# Patient Record
Sex: FEMALE
[De-identification: names, ages, dates, MRNs, and addresses within clinical notes are randomized per-mention and may not be internally consistent; named-entity substitution may affect disease eponyms.]

---

## 2018-08-04 ENCOUNTER — HOSPITAL ENCOUNTER (INPATIENT)
Dept: HOSPITAL 5 - ED | Age: 48
LOS: 2 days | Discharge: HOME | DRG: 103 | End: 2018-08-06
Attending: INTERNAL MEDICINE | Admitting: INTERNAL MEDICINE
Payer: MEDICAID

## 2018-08-04 DIAGNOSIS — Z82.49: ICD-10-CM

## 2018-08-04 DIAGNOSIS — G43.909: Primary | ICD-10-CM

## 2018-08-04 DIAGNOSIS — Z88.2: ICD-10-CM

## 2018-08-04 DIAGNOSIS — Z90.89: ICD-10-CM

## 2018-08-04 DIAGNOSIS — Z90.49: ICD-10-CM

## 2018-08-04 DIAGNOSIS — R07.9: ICD-10-CM

## 2018-08-04 DIAGNOSIS — F17.200: ICD-10-CM

## 2018-08-04 DIAGNOSIS — F31.9: ICD-10-CM

## 2018-08-04 DIAGNOSIS — Z88.1: ICD-10-CM

## 2018-08-04 DIAGNOSIS — Z98.51: ICD-10-CM

## 2018-08-04 DIAGNOSIS — G45.9: ICD-10-CM

## 2018-08-04 PROCEDURE — 99291 CRITICAL CARE FIRST HOUR: CPT

## 2018-08-04 PROCEDURE — 93010 ELECTROCARDIOGRAM REPORT: CPT

## 2018-08-04 PROCEDURE — 85610 PROTHROMBIN TIME: CPT

## 2018-08-04 PROCEDURE — 70551 MRI BRAIN STEM W/O DYE: CPT

## 2018-08-04 PROCEDURE — 80061 LIPID PANEL: CPT

## 2018-08-04 PROCEDURE — 84484 ASSAY OF TROPONIN QUANT: CPT

## 2018-08-04 PROCEDURE — 36415 COLL VENOUS BLD VENIPUNCTURE: CPT

## 2018-08-04 PROCEDURE — 99406 BEHAV CHNG SMOKING 3-10 MIN: CPT

## 2018-08-04 PROCEDURE — 85025 COMPLETE CBC W/AUTO DIFF WBC: CPT

## 2018-08-04 PROCEDURE — 84703 CHORIONIC GONADOTROPIN ASSAY: CPT

## 2018-08-04 PROCEDURE — 80048 BASIC METABOLIC PNL TOTAL CA: CPT

## 2018-08-04 PROCEDURE — 82962 GLUCOSE BLOOD TEST: CPT

## 2018-08-04 PROCEDURE — 93880 EXTRACRANIAL BILAT STUDY: CPT

## 2018-08-04 PROCEDURE — 93005 ELECTROCARDIOGRAM TRACING: CPT

## 2018-08-04 PROCEDURE — 70450 CT HEAD/BRAIN W/O DYE: CPT

## 2018-08-04 PROCEDURE — 85730 THROMBOPLASTIN TIME PARTIAL: CPT

## 2018-08-04 PROCEDURE — 93306 TTE W/DOPPLER COMPLETE: CPT

## 2018-08-05 LAB
APTT BLD: 28.9 SEC. (ref 24.2–36.6)
BASOPHILS # (AUTO): 0 K/MM3 (ref 0–0.1)
BASOPHILS NFR BLD AUTO: 0.6 % (ref 0–1.8)
BUN SERPL-MCNC: 9 MG/DL (ref 7–17)
BUN/CREAT SERPL: 9 %
CALCIUM SERPL-MCNC: 8.8 MG/DL (ref 8.4–10.2)
EOSINOPHIL # BLD AUTO: 0.3 K/MM3 (ref 0–0.4)
EOSINOPHIL NFR BLD AUTO: 3.6 % (ref 0–4.3)
HCT VFR BLD CALC: 33.6 % (ref 30.3–42.9)
HDLC SERPL-MCNC: 58 MG/DL (ref 40–59)
HEMOLYSIS INDEX: 2
HGB BLD-MCNC: 12.1 GM/DL (ref 10.1–14.3)
INR PPP: 0.89 (ref 0.87–1.13)
LYMPHOCYTES # BLD AUTO: 3.7 K/MM3 (ref 1.2–5.4)
LYMPHOCYTES NFR BLD AUTO: 46.6 % (ref 13.4–35)
MCH RBC QN AUTO: 30 PG (ref 28–32)
MCHC RBC AUTO-ENTMCNC: 36 % (ref 30–34)
MCV RBC AUTO: 83 FL (ref 79–97)
MONOCYTES # (AUTO): 0.6 K/MM3 (ref 0–0.8)
MONOCYTES % (AUTO): 8.1 % (ref 0–7.3)
PLATELET # BLD: 334 K/MM3 (ref 140–440)
RBC # BLD AUTO: 4.08 M/MM3 (ref 3.65–5.03)

## 2018-08-05 RX ADMIN — ACETAMINOPHEN PRN MG: 325 TABLET ORAL at 17:49

## 2018-08-05 RX ADMIN — ENOXAPARIN SODIUM SCH MG: 100 INJECTION SUBCUTANEOUS at 11:42

## 2018-08-05 RX ADMIN — SODIUM CHLORIDE SCH MLS/HR: 0.9 INJECTION, SOLUTION INTRAVENOUS at 06:21

## 2018-08-05 RX ADMIN — SODIUM CHLORIDE SCH MLS/HR: 0.9 INJECTION, SOLUTION INTRAVENOUS at 17:50

## 2018-08-05 RX ADMIN — ASPIRIN SCH: 325 TABLET ORAL at 10:00

## 2018-08-05 NOTE — EMERGENCY DEPARTMENT REPORT
- General


Chief complaint: Chest Pain


Stated complaint: HEAD/NECK/CHEST PAIN


Time Seen by Provider: 08/05/18 00:35


Source: patient, EMS


Mode of arrival: Ambulatory


Limitations: No Limitations





- History of Present Illness


Initial comments: 


She is a 48-year-old female that presents emergency room with chest pain 1 

hour.  Patient also reports having numbness tingling in both arms and legs.  

Patient states she is also having a bad headache.  Patient states she lost the 

ability to speak for approximately 45-50 minutes.  Patient states her blood 

pressure really high school and on and she took a blood pressure medication and 

her ability to speak and back.  Patient denies nausea and vomiting and 

shortness of breath.  Patient states the chest pain is a pressure in her 

central chest and non-radiating.  Patient states chest pain and headache are 

better with rest and worse with exertion.  Patient states that she never lost 

ability to move her arms and legs.





MD Complaint: generalized weakness


-: Sudden





- Related Data


 Allergies











Allergy/AdvReac Type Severity Reaction Status Date / Time


 


Sulfa (Sulfonamide Allergy  Shortness Verified 03/31/18 11:36





Antibiotics)   of Breath  


 


sulfamethoxazole Allergy  Unknown Verified 08/04/18 23:42





[From Bactrim]     


 


trimethoprim [From Bactrim] Allergy  Unknown Verified 08/04/18 23:42














ED Review of Systems


ROS: 


Stated complaint: HEAD/NECK/CHEST PAIN


Other details as noted in HPI





Constitutional: denies: chills, fever


Eyes: denies: eye pain, eye discharge, vision change


ENT: denies: ear pain, throat pain


Respiratory: denies: cough, shortness of breath, wheezing


Cardiovascular: chest pain.  denies: palpitations


Endocrine: no symptoms reported


Gastrointestinal: denies: abdominal pain, nausea, diarrhea


Genitourinary: denies: urgency, dysuria, discharge


Musculoskeletal: denies: back pain, joint swelling, arthralgia


Skin: denies: rash, lesions


Neurological: headache.  denies: weakness, paresthesias


Psychiatric: denies: anxiety, depression


Hematological/Lymphatic: denies: easy bleeding, easy bruising





ED Past Medical Hx





- Past Medical History


Previous Medical History?: Yes


Hx Headaches / Migraines: Yes


Hx Psychiatric Treatment: Yes (Bipolar)


Additional medical history: MRSA, Speech impairment, stomach ulcers





- Surgical History


Past Surgical History?: Yes


Additional Surgical History: Tonsil removed, Tubaligation, right Carpel tunnel; 

surgery





- Family History


Family history: hypertension





- Social History


Smoking Status: Current Every Day Smoker


Substance Use Type: None





ED Physical Exam





- General


Limitations: No Limitations


General appearance: alert, in no apparent distress





- Head


Head exam: Present: atraumatic, normocephalic





- Eye


Eye exam: Present: normal appearance





- ENT


ENT exam: Present: mucous membranes moist





- Neck


Neck exam: Present: normal inspection





- Respiratory


Respiratory exam: Present: normal lung sounds bilaterally.  Absent: respiratory 

distress





- Cardiovascular


Cardiovascular Exam: Present: regular rate, normal rhythm.  Absent: systolic 

murmur, diastolic murmur, rubs, gallop





- GI/Abdominal


GI/Abdominal exam: Present: soft, normal bowel sounds





- Extremities Exam


Extremities exam: Present: normal inspection





- Back Exam


Back exam: Present: normal inspection





- Neurological Exam


Neurological exam: Present: alert, oriented X3





- Psychiatric


Psychiatric exam: Present: normal affect, normal mood





- Skin


Skin exam: Present: warm, dry, intact, normal color.  Absent: rash





- Assessment


Assessment Interval: Baseline





- Level of Consciousness


1a. Level of Consciousness: alert





- LOC Questions


1b. LOC Questions: answers correctly





- LOC Command


1c. LOC Commands: performs tasks correctly





- Best Gaze


2. Best Gaze: normal





- Visual


3. Visual: no visual loss





- Facial Palsy


4. Facial Palsy: normal symmetrical movement





- Motor Arm


5b. Motor Arm Right: no drift


5a. Motor Arm Left: no drift





- Motor Leg


6a. Motor Leg Left: no drift


6b. Motor Leg Right: no drift





- Limb Ataxia


7. Limb Ataxia: absent





- Sensory


8. Sensory: normal





- Best Language


9. Best Language: no aphasia





- Dysarthria


10. Dysarthria: normal





- Extinction and Inattention


11. Extinction/Inattention: no abnormality





- Scoring


Total Score: 0


Stroke Severity: No Stroke Symptoms





ED Course


 Vital Signs











  08/04/18 08/05/18 08/05/18





  23:49 00:48 01:01


 


Temperature 97.9 F  


 


Pulse Rate 70 59 L 57 L


 


Respiratory 20 14 12





Rate   


 


Blood Pressure 139/70  


 


O2 Sat by Pulse 100  98





Oximetry   














  08/05/18 08/05/18 08/05/18





  01:15 01:31 01:45


 


Temperature   


 


Pulse Rate 58 L 58 L 56 L


 


Respiratory 10 L 13 15





Rate   


 


Blood Pressure 128/93 110/47 112/46


 


O2 Sat by Pulse 97 97 96





Oximetry   














  08/05/18 08/05/18 08/05/18





  02:01 02:15 02:31


 


Temperature   


 


Pulse Rate 51 L 68 51 L


 


Respiratory 12 13 15





Rate   


 


Blood Pressure 107/44 95/55 102/79


 


O2 Sat by Pulse 99 98 99





Oximetry   














  08/05/18 08/05/18 08/05/18





  03:55 04:01 04:11


 


Temperature   


 


Pulse Rate 60 60 58 L


 


Respiratory 12 11 L 14





Rate   


 


Blood Pressure 116/74 110/66 116/74


 


O2 Sat by Pulse 100 99 99





Oximetry   














  08/05/18 08/05/18





  04:21 04:31


 


Temperature  


 


Pulse Rate 59 L 60


 


Respiratory 14 13





Rate  


 


Blood Pressure 113/63 113/63


 


O2 Sat by Pulse 98 98





Oximetry  














- Reevaluation(s)


Reevaluation #1: 


Exam documented.  Patient sent to CT for stat CT of head.


08/05/18 00:35





Reevaluation #2: 


CT head negative.  We'll consult tele neuro. per clinical exam,  the patient is 

not a candidate for TPA


08/05/18 00:43











Reevaluation #3: 


Discussed case with neurologist. Dr. Vanegas agrees with plan of care that the 

patient is not a candidate for TPA, however recommends admission to the 

hospital and MRI


08/05/18 01:55

















- Consultations


Consultation #1: 


Hospitalist consulted for admission.  Dr. Griffin to admit patient.  Dr. Griffin to 

assume care.


08/05/18 02:34








ED Medical Decision Making





- Lab Data


Result diagrams: 


 08/05/18 00:28





 08/05/18 00:28





- EKG Data


-: EKG Interpreted by Me


EKG shows normal: sinus rhythm, axis, intervals, QRS complexes, ST-T waves


Rate: bradycardia





- Radiology Data


Radiology results: report reviewed


nad on head ct. 








- Medical Decision Making


Patient is a 48-year-old female that presents emergency room with complaints of 

chest pain and inability to speak.  All symptoms have resolved on patient.  

Neurologist consulted and recommends admission to the hospital and at MRI.  

Patient to be admitted to the hospitalist service for further evaluation and 

treatment.








- Differential Diagnosis


cp. acs. tia. htn. ha. migraine. 


Critical Care Time: Yes


Critical care attestation.: 


If time is entered above; I have spent that time in minutes in the direct care 

of this critically ill patient, excluding procedure time.





Critical Care Time: 


45 minutes for cc time. 








ED Disposition


Clinical Impression: 


 Weakness, TIA (transient ischemic attack)





Headache


Qualifiers:


 Headache type: unspecified Headache chronicity pattern: acute headache 

Intractability: not intractable Qualified Code(s): R51 - Headache





Chest pain


Qualifiers:


 Chest pain type: unspecified Qualified Code(s): R07.9 - Chest pain, unspecified





Disposition: DC-09 OP ADMIT IP TO THIS HOSP


Is pt being admited?: Yes


Does the pt Need Aspirin: No


Condition: Critical


Time of Disposition: 02:35

## 2018-08-05 NOTE — CONSULTATION
History of Present Illness


Consult date: 08/05/18


History of present illness: 


notes reveal she had slurring of speech and left arm numbness  the MRI is 

normal neuro exam is normal except c/o headaches  suspect she has speech 

impediment  chronic  await Carotid u/s and ECHO  TIA w[u is pending





Thanks will follow up





explained w/u to the patient








Medications and Allergies


 Allergies











Allergy/AdvReac Type Severity Reaction Status Date / Time


 


Sulfa (Sulfonamide Allergy  Shortness Verified 03/31/18 11:36





Antibiotics)   of Breath  


 


sulfamethoxazole Allergy  Unknown Verified 08/04/18 23:42





[From Bactrim]     


 


trimethoprim [From Bactrim] Allergy  Unknown Verified 08/04/18 23:42











Active Meds: 


Active Medications





Acetaminophen (Tylenol)  650 mg PO Q4H PRN


   PRN Reason: Pain, Mild (1-3)


Aspirin (Aspirin)  325 mg PO QDAY [facility]


   Last Admin: 08/05/18 10:00 Dose:  Not Given


Atorvastatin Calcium (Lipitor)  40 mg PO QHS JULIO CESAR


Bisacodyl (Dulcolax)  10 mg NH QDAY PRN


   PRN Reason: Constipation


Enoxaparin Sodium (Lovenox)  40 mg SUB-Q QDAY@1000 [facility]


   Last Admin: 08/05/18 11:42 Dose:  40 mg


Hydralazine HCl (Apresoline)  5 mg IV Q6H PRN


   PRN Reason: Keep SBP between 160-185 mm Hg


Sodium Chloride (Nacl 0.9% 1000 Ml)  1,000 mls @ 150 mls/hr IV AS DIRECT [facility]


   Last Admin: 08/05/18 06:21 Dose:  150 mls/hr


Magnesium Hydroxide (Milk Of Magnesia)  30 ml PO Q4H PRN


   PRN Reason: Constipation


Ondansetron HCl (Zofran)  4 mg IV Q8H PRN


   PRN Reason: Nausea And Vomiting


Sodium Chloride (Sodium Chloride Flush Syringe 10 Ml)  10 ml IV PRN PRN


   PRN Reason: LINE FLUSH











Physical Examination





- Vital Signs


Vital Signs: 


 Vital Signs











Temp Pulse Resp BP Pulse Ox


 


 97.9 F   70   20   139/70   100 


 


 08/04/18 23:49  08/04/18 23:49  08/04/18 23:49  08/04/18 23:49  08/04/18 23:49














- Assessment


Assessment Interval: Baseline





- Level of Consciousness


1a. Level of Consciousness: alert





- LOC Questions


1b. LOC Questions: answers correctly





- LOC Command


1c. LOC Commands: performs tasks correctly





- Best Gaze


2. Best Gaze: normal





- Visual


3. Visual: no visual loss





- Facial Palsy


4. Facial Palsy: normal symmetrical movement





- Motor Arm


5b. Motor Arm Right: no drift





- Motor Leg


6a. Motor Leg Left: no drift





- Limb Ataxia


7. Limb Ataxia: absent





- Sensory


8. Sensory: normal





- Best Language


9. Best Language: no aphasia





- Dysarthria


10. Dysarthria: normal





- Extinction and Inattention


11. Extinction/Inattention: no abnormality





Results





- Laboratory Findings


CBC and BMP: 


 08/05/18 00:28





 08/05/18 00:28


Abnormal Lab Findings: 


 Abnormal Labs











  08/05/18 08/05/18 08/05/18





  00:28 00:28 12:01


 


MCHC  36 H  


 


Lymph % (Auto)  46.6 H  


 


Mono % (Auto)  8.1 H  


 


Glucose   112 H 


 


Cholesterol    224 H


 


LDL Cholesterol Direct    172 H

## 2018-08-05 NOTE — HISTORY AND PHYSICAL REPORT
History of Present Illness


Date of examination: 08/05/18


History of present illness: 


48-year-old woman with history of hypertension comes emergency room because she 

developed numbness on the arms, left leg and aphasia.  When she arrived in the 

emergency room she started speaking but her speech is slurred


Review of systems


Constitutional: no  weight loss, chills, fever


Ears, eyes, nose, mouth and throat: no nasal congestion, no nasal discharge, no 

sinus pressure, no vision change, no red eye.


Neck: No neck pain or rigidity.


Cardiovascular: no chest pain, palpitations


Respiratory: no cough, shortness of breath


Gastrointestinal: no abdominal pain hematochezia


Genitourinary : no  frequency , no hematuria


Musculoskeletal: no joint swelling or muscle ache 


Integumentary: no rash, no pruritis


Neurological: no  focal weakness


Endocrine: no cold or heat intolerance, no polyuria or polydipsia


Hematologic/Lymphatic: no easy bruising, no easy bleeding, no gland swelling


Allergic/Immunologic: no urticaria, no angioedema.





PAST MEDICAL HISTORY: Hypertension





PAST SURGICAL HISTORY: Tubal ligation, carpal tunnel release, tonsillectomy





SOCIAL HISTORY: No alcohol, no drugs, smokes





FAMILY HISTORY: Hypertension








Medications and Allergies


 Allergies











Allergy/AdvReac Type Severity Reaction Status Date / Time


 


Sulfa (Sulfonamide Allergy  Shortness Verified 03/31/18 11:36





Antibiotics)   of Breath  


 


sulfamethoxazole Allergy  Unknown Verified 08/04/18 23:42





[From Bactrim]     


 


trimethoprim [From Bactrim] Allergy  Unknown Verified 08/04/18 23:42














Exam





- Physical Exam


Narrative exam: 


Gen. appearance: Patient lying in bed, no apparent distress


HEENT: Normocephalic, atraumatic, pupils equally round and reactive to light,  

extraocular movement intact, and no sclericterus,. No JVD or thyromegaly or 

nodule,neck supple, no carotid bruit ,mucous membranes moist, no exudate or 

erythema


Heart: S1, S2, regular rate and rhythm


Lungs: Clear bilaterally, breathing comfortable


Abdomen: Positive bowel sounds, non-tender, nondistended, no organomegaly


Extremity:no edema cyanosis, clubbing


Skin:  no rash, dry, warm


Neuro: Oriented 3, cranial nerves II-12 intact, speech is slurred, motor and 

sensory intact














- Constitutional


Vitals: 


 











Temp Pulse Resp BP Pulse Ox


 


 97.9 F   51 L  15   102/79   99 


 


 08/04/18 23:49  08/05/18 02:31  08/05/18 02:31  08/05/18 02:31  08/05/18 02:31














Results





- Labs


CBC & Chem 7: 


 08/05/18 00:28





 08/05/18 00:28


Labs: 


 Abnormal lab results











  08/05/18 08/05/18 Range/Units





  00:28 00:28 


 


MCHC  36 H   (30-34)  %


 


Lymph % (Auto)  46.6 H   (13.4-35.0)  %


 


Mono % (Auto)  8.1 H   (0.0-7.3)  %


 


Glucose   112 H  ()  mg/dL














- Imaging and Cardiology


CT Scan - head: report reviewed





Assessment and Plan


Assessment


Acute CVA


Hypertension





Plan


Admit medicine


Obtain MRI of the head, carotid Doppler, echo


Do neuro checks, swallow screen


Start aspirin, statin


Albuterol as needed for blood pressure check


Consult neurology, physical and occupational therapy


DVT prophylaxis

## 2018-08-05 NOTE — CAT SCAN REPORT
FINAL REPORT



PROCEDURE:  CT HEAD/BRAIN WO CON



TECHNIQUE:  Computerized tomography of the head was performed

without contrast material. 



HISTORY:  neuro deficits &lt; 6hrs or sx present upon awakening





COMPARISON:  No prior studies are available for comparison.



FINDINGS:  

Skull and scalp: Normal.



Paranasal sinuses: Normal.



Ventricles and subarachnoid spaces: Normal.



Cerebrum: No evidence of hemorrhage, acute infarction or mass .



Cerebellum and brainstem: No evidence of hemorrhage, acute

infarction or mass.



Vasculature: Normal.



Comments: None.



IMPRESSION:  

There is no evidence of an acute intracranial process
normal...

## 2018-08-05 NOTE — MAGNETIC RESONANCE REPORT
FINAL REPORT



EXAM:  MR BRAIN WO CON



HISTORY:  stroke 



COMPARISON:  CT of the head performed on 08/04/2018



TECHNIQUE:  Multiplanar and multisequential imaging of the brain

was performed without administration of IV contrast



FINDINGS:  

There is normal brain volume for the patient's age. There is

normal gray-white differentiation. There is no parenchymal

hemorrhage or extra-axial fluid collection. There is no mass or

mass effect. There is no restricted diffusion to suggest acute

infarct. The ventricles are midline and are not enlarged. The

subarachnoid spaces and basilar cisterns are clear. There is

normal signal of the bony calvarium and the soft tissues of the

scalp. The bilateral orbits are intact.



IMPRESSION:  

No acute intracranial abnormality.

## 2018-08-05 NOTE — EVENT NOTE
Date: 08/05/18


Patient with slurred speech, left arm numbness. MRI Brain negative. Neurology 

to see.

## 2018-08-06 VITALS — DIASTOLIC BLOOD PRESSURE: 78 MMHG | SYSTOLIC BLOOD PRESSURE: 130 MMHG

## 2018-08-06 RX ADMIN — ENOXAPARIN SODIUM SCH MG: 100 INJECTION SUBCUTANEOUS at 10:55

## 2018-08-06 RX ADMIN — SODIUM CHLORIDE SCH MLS/HR: 0.9 INJECTION, SOLUTION INTRAVENOUS at 06:31

## 2018-08-06 RX ADMIN — ASPIRIN SCH MG: 325 TABLET ORAL at 10:55

## 2018-08-06 RX ADMIN — ACETAMINOPHEN PRN MG: 325 TABLET ORAL at 05:00

## 2018-08-06 NOTE — DISCHARGE SUMMARY
Providers





- Providers


Date of Admission: 


08/05/18 03:12





Date of discharge: 08/06/18


Attending physician: 


KELSY QIUÑONEZ





 





08/05/18


Consult to Physician [CONS] Routine 


   Comment: 


   Consulting Provider: SUNNY GIBBS


   Physician Instructions: 


   Reason For Exam: tia/cva





08/05/18 05:31


Occupational Therapy Evaluate and Treat [CONS] Routine 


   Comment: 


   Reason For Exam: Neuro deficits


Physical Therapy Evaluation and Treat [CONS] Routine 


   Comment: 


   Reason For Exam: Neuro deficits





08/06/18 08:03


Speech Therapy Evaluation and Treat [CONS] Routine 


   Reason For Exam: dysartria











Primary care physician: 


PRIMARY CARE MD








Hospitalization


Condition: Fair


Disposition: DC-01 TO HOME OR SELFCARE





Core Measure Documentation





- Palliative Care


Palliative Care/ Comfort Measures: Not Applicable





- Core Measures


Any of the following diagnoses?: none





Exam





- Constitutional


Vitals: 


 











Temp Pulse Resp BP Pulse Ox


 


 97.9 F   55 L  20   130/78   97 


 


 08/06/18 11:40  08/06/18 11:40  08/06/18 11:40  08/06/18 11:40  08/06/18 11:40














Plan


Activity: advance as tolerated


Diet: low fat, low cholesterol, low salt


Additional Instructions: 1.Follow up with PCP in 1 week.  2.Follow up with 

Neurology, Dr. Gibbs in 1 week.  3.Outpatient speech therapy


Follow up with: 


PRIMARY CARE,MD [Primary Care Provider] - 7 Days


Prescriptions: 


AtorvaSTATin [Lipitor] 40 mg PO QHS #30 tablet


Butalb/Acetamin/Caff -40 [Fioricet] 1 tab PO Q4H PRN #20 tablet


 PRN Reason: Headache


Famotidine [Pepcid] 20 mg PO BID #30 tablet

## 2018-08-06 NOTE — CONSULTATION
ROOM NUMBER:  [id number]



HISTORY OF PRESENT ILLNESS:  This is a 48-year-old white female that presents to

[facility] on 08/03/2018.  This patient initially

presented to the hospital Emergency Room with complaints of numbness and

weakness in both arms and legs.  She stated that she was having severe headache

and loss of ability to speak for approximately 45-50 minutes.  Her blood

pressure was initially elevated although in presentation in the Emergency Room,

review of the blood pressures on 08/04/2018 and 08/05/2018 indicated they are

all within the normal range.  She had a CT scan of the head, which was negative.

 The patient was admitted to the hospital subsequently with continued complaints

of chest pain and inability to speak.  By the time I have seen the patient,

review of the electrolytes were unremarkable.  Glucose was 112, hematocrit 33.6.

 Her pO2 was 98%.  She was alert, appropriate.  Speech was clear, although she

did have to me a speech impediment with slurring of speech, which is typical for

inarticulate speech as opposed to aphasia.  She has symmetrical head and neck

movements, symmetrical facial movements.  No drift to extremities.  She does

complain of a slight headache at present time.  I do not find any focal motor

weakness in the arms or legs.  I did review her MRI scan of the brain, it is

unremarkable.  I have also reviewed her echocardiogram, which shows a normal

ejection fraction of 55-60%, ventricular systolic function is good.  I would

comment that there appears to be very mild tricuspid valve regurgitation.  The

other changes in the ___ echocardiogram is normal.



IMPRESSION:  I am inclined to think this patient's problems are related to

migraine.  She had a headache.  She has this patchy numbness, which comes and

goes in her arms and legs, although objectively there is nothing on examination,

which points this being a hard finding.  I do not find any focal motor weakness.

 Her dysarthric speech I believe is preexisting speech inarticulation as opposed

to aphasia, but more detailed history from family might be useful if there has

been some change.  I do not see anything on the MRI scan and carotid artery

ultrasound has not been yet interpreted.  We will get an EEG on the patient,

also sed rate.





DD: 08/06/2018 08:22

DT: 08/06/2018 11:09

JOB# [id number]  [id number]

STANISLAV/NTS

## 2018-08-06 NOTE — PROGRESS NOTE
Subjective


Date of service: 08/06/18


Interval history: 


see my dictated note the patient I suspect has complicated migraine doubt 

stroke based on ECHO and MRI results  BP's have been normal








Objective





- Vital Sign


 Vital Signs - 12hr











  08/05/18 08/05/18 08/06/18





  22:44 23:29 04:12


 


Temperature  98.1 F 98.2 F


 


Pulse Rate  57 L 67


 


Respiratory  18 18





Rate   


 


Blood Pressure  118/52 125/78


 


O2 Sat by Pulse 98 97 99





Oximetry   














- Laboratory Findings


CBC and BMP: 


 08/05/18 00:28





 08/05/18 00:28


Abnormal Lab Findings: 


 Abnormal Labs











  08/05/18 08/05/18 08/05/18





  00:28 00:28 12:01


 


MCHC  36 H  


 


Lymph % (Auto)  46.6 H  


 


Mono % (Auto)  8.1 H  


 


Glucose   112 H 


 


POC Glucose   


 


Cholesterol    224 H


 


LDL Cholesterol Direct    172 H














  08/05/18 08/05/18





  15:59 21:54


 


MCHC  


 


Lymph % (Auto)  


 


Mono % (Auto)  


 


Glucose  


 


POC Glucose  107 H  110 H


 


Cholesterol  


 


LDL Cholesterol Direct

## 2018-08-07 NOTE — VASCULAR LAB REPORT
CAROTID DUPLEX STUDY:



RIGHT        PSVEDV

CCA PROX:[id number]

CCA DIST:[id number]

ICA PROX:[id number]

ICA MID:[id number]

ICA DIST:[id number]

ECA:               94

VERT:                  54       24  



LEFT         PSVEDV

CCA PROX:[id number]

CCA DIST:8330

ICA PROX:[id number]

ICA MID:[id number]

ICA DIST:[id number]

ECA:                    74

VERT:                  49       20





REASON FOR EXAM: Stroke.

     

COMMENTS ON THE RIGHT:

Doppler frequency analysis is consistent with 16 to 49 percent diameter 

reduction of the internal carotid artery.  Minimal amount of plaque is 

seen.  The common carotid artery is patent. The external carotid artery 

is patent.  The vertebral artery has antegrade flow.



COMMENTS ON THE LEFT:

Doppler frequency analysis is consistent with 16 to 49 percent diameter 

reduction of the internal carotid artery.  Minimal amount of plaque is 

seen.  The common carotid artery is patent. The external carotid artery 

is patent.  The vertebral artery has antegrade flow.



IMPRESSION:

Less than 50% diameter reduction in the internal carotid arteries 

bilaterally.

## 2018-08-21 ENCOUNTER — HOSPITAL ENCOUNTER (EMERGENCY)
Dept: HOSPITAL 5 - ED | Age: 48
Discharge: HOME | End: 2018-08-21
Payer: MEDICAID

## 2018-08-21 DIAGNOSIS — Z98.51: ICD-10-CM

## 2018-08-21 DIAGNOSIS — Z88.2: ICD-10-CM

## 2018-08-21 DIAGNOSIS — F17.200: ICD-10-CM

## 2018-08-21 DIAGNOSIS — G43.109: Primary | ICD-10-CM

## 2018-08-21 DIAGNOSIS — I10: ICD-10-CM

## 2018-08-21 DIAGNOSIS — Z90.89: ICD-10-CM

## 2018-08-21 DIAGNOSIS — F31.9: ICD-10-CM

## 2018-08-21 LAB
ALBUMIN SERPL-MCNC: 4.3 G/DL (ref 3.9–5)
ALT SERPL-CCNC: 12 UNITS/L (ref 7–56)
BASOPHILS # (AUTO): 0.1 K/MM3 (ref 0–0.1)
BASOPHILS NFR BLD AUTO: 0.9 % (ref 0–1.8)
BILIRUB DIRECT SERPL-MCNC: < 0.2 MG/DL (ref 0–0.2)
BUN SERPL-MCNC: 10 MG/DL (ref 7–17)
BUN/CREAT SERPL: 11 %
CALCIUM SERPL-MCNC: 9.5 MG/DL (ref 8.4–10.2)
EOSINOPHIL # BLD AUTO: 0.2 K/MM3 (ref 0–0.4)
EOSINOPHIL NFR BLD AUTO: 2.5 % (ref 0–4.3)
HCT VFR BLD CALC: 37.6 % (ref 30.3–42.9)
HEMOLYSIS INDEX: 12
HGB BLD-MCNC: 12.8 GM/DL (ref 10.1–14.3)
LYMPHOCYTES # BLD AUTO: 2.9 K/MM3 (ref 1.2–5.4)
LYMPHOCYTES NFR BLD AUTO: 39 % (ref 13.4–35)
MCH RBC QN AUTO: 29 PG (ref 28–32)
MCHC RBC AUTO-ENTMCNC: 34 % (ref 30–34)
MCV RBC AUTO: 85 FL (ref 79–97)
MONOCYTES # (AUTO): 0.6 K/MM3 (ref 0–0.8)
MONOCYTES % (AUTO): 7.7 % (ref 0–7.3)
PLATELET # BLD: 250 K/MM3 (ref 140–440)
RBC # BLD AUTO: 4.44 M/MM3 (ref 3.65–5.03)

## 2018-08-21 PROCEDURE — 85025 COMPLETE CBC W/AUTO DIFF WBC: CPT

## 2018-08-21 PROCEDURE — 36415 COLL VENOUS BLD VENIPUNCTURE: CPT

## 2018-08-21 PROCEDURE — 99284 EMERGENCY DEPT VISIT MOD MDM: CPT

## 2018-08-21 PROCEDURE — 80074 ACUTE HEPATITIS PANEL: CPT

## 2018-08-21 PROCEDURE — 70450 CT HEAD/BRAIN W/O DYE: CPT

## 2018-08-21 PROCEDURE — 80048 BASIC METABOLIC PNL TOTAL CA: CPT

## 2018-08-21 NOTE — CAT SCAN REPORT
FINAL REPORT



EXAM:  CT HEAD/BRAIN WO CON



HISTORY:  right arm numbess recent CVa 



TECHNIQUE:  2.5 millimeter axial images from the skullbase to the

vertex.



Comparison: MRI brain dated August 5, 2018 and head CT dated

August 5, 2018 



FINDINGS:  

There is no evidence of an acute intracranial process,

intracranial hemorrhage or mass effect.



The ventricles are normal size.



The visualized portions of the orbits, paranasal and mastoid

sinuses are unremarkable.



The bony structures are unremarkable in appearance.



IMPRESSION:  

1. No evidence of an acute intracranial process, intracranial

hemorrhage or mass effect.



If there is a clinical suspicion of acute cerebral ischemia, MRI

brain would be helpful.

## 2018-08-21 NOTE — EMERGENCY DEPARTMENT REPORT
ED Neuro Deficit HPI





- General


Chief Complaint: Neuro Symptoms/Deficit


Stated Complaint: RT ARM NUMBNESS


Time Seen by Provider: 08/21/18 20:06


Source: patient, EMS


Mode of arrival: Ambulatory


Limitations: No Limitations





- History of Present Illness


Initial Comments: 





He is a 48-year-old  female who is presenting with right arm numbness 

that has been off and on for the past 3 days.  Patient states that earlier this 

month she was admitted for possible stroke.  A review of the patient's past 

medical history the patient was admitted on 08/05/2018 and had a headache 

elevated blood pressure chest pain and was unable to speak for approximately 50 

minutes prior to arrival.  Patient states she took her blood pressure medicines 

and then was able to speak again.  Patient was admitted to the hospital had a 

MRI that was normal NIH was 0 and the neurology consult stated the patient most 

likely has compensated migraines as opposed to CVA.  Patient also had carotid 

Dopplers which were within normal limits as well.  Patient today states that 

she has no weakness in arms or legs she just feels a numb sensation.  Patient 

is complaining of right-sided headache as well.  Patient states the headache is 

a 67 out of 10 in severity and pounding.





- Related Data


Home Medications: 


 Previous Rx's











 Medication  Instructions  Recorded  Last Taken  Type


 


AtorvaSTATin [Lipitor] 40 mg PO QHS #30 tablet 08/06/18 Unknown Rx


 


Butalb/Acetamin/Caff -40 1 tab PO Q4H PRN #20 tablet 08/06/18 Unknown Rx





[Fioricet]    


 


Famotidine [Pepcid] 20 mg PO BID #30 tablet 08/06/18 Unknown Rx


 


Nicotine [Habitrol] 21 mg TD DAILY 30 Days  patch 08/06/18 Unknown Rx


 


Butalb/Acetamin/Caff -40 1 tab PO Q6HR PRN #10 tab 08/21/18 Unknown Rx





[Fioricet]    











Allergies/Adverse Reactions: 


 Allergies











Allergy/AdvReac Type Severity Reaction Status Date / Time


 


Sulfa (Sulfonamide Allergy  Shortness Verified 03/31/18 11:36





Antibiotics)   of Breath  


 


sulfamethoxazole Allergy  Unknown Verified 08/04/18 23:42





[From Bactrim]     


 


trimethoprim [From Bactrim] Allergy  Unknown Verified 08/04/18 23:42














ED Review of Systems


ROS: 


Stated complaint: RT ARM NUMBNESS


Other details as noted in HPI





Comment: All other systems reviewed and negative





ED Past Medical Hx





- Past Medical History


Previous Medical History?: Yes


Hx Hypertension: Yes


Hx Headaches / Migraines: Yes


Hx Psychiatric Treatment: Yes (Bipolar)


Additional medical history: MRSA, Speech impairment, stomach ulcers





- Surgical History


Past Surgical History?: Yes


Additional Surgical History: Tonsil removed, Tubal ligation 1999, right Carpel 

tunnel; surgery





- Social History


Smoking Status: Current Every Day Smoker


Substance Use Type: None





- Medications


Home Medications: 


 Home Medications











 Medication  Instructions  Recorded  Confirmed  Last Taken  Type


 


AtorvaSTATin [Lipitor] 40 mg PO QHS #30 tablet 08/06/18  Unknown Rx


 


Butalb/Acetamin/Caff -40 1 tab PO Q4H PRN #20 tablet 08/06/18  Unknown Rx





[Fioricet]     


 


Famotidine [Pepcid] 20 mg PO BID #30 tablet 08/06/18  Unknown Rx


 


Nicotine [Habitrol] 21 mg TD DAILY 30 Days  patch 08/06/18  Unknown Rx


 


Butalb/Acetamin/Caff -40 1 tab PO Q6HR PRN #10 tab 08/21/18  Unknown Rx





[Fioricet]     














ED Neuro Physical Exam





- General


Limitations: No Limitations


General appearance: alert, in no apparent distress


Suspected Stroke: No





- Head


Head exam: Present: atraumatic, normocephalic





- Eye


Eye exam: Present: normal appearance





- ENT


ENT exam: Present: mucous membranes moist





- Neck


Neck exam: Present: normal inspection





- Respiratory


Respiratory exam: Present: normal lung sounds bilaterally.  Absent: respiratory 

distress, wheezes, rales, rhonchi





- Cardiovascular


Cardiovascular Exam: Present: regular rate, normal rhythm.  Absent: systolic 

murmur, diastolic murmur, rubs, gallop





- GI/Abdominal


GI/Abdominal exam: Present: soft, normal bowel sounds.  Absent: distended, 

tenderness, guarding, rebound





- Extremities Exam


Extremities exam: Present: normal inspection





- Back Exam


Back exam: Present: normal inspection





- Neurological Exam


Neurological exam: Present: alert, oriented X3, CN II-XII intact, other (agents 

speaks with a lisp at baseline and does see a speech pathologist)





- NIHSS


Assessment Interval: Baseline


1a. Level of Consciousness: alert


1b. LOC Questions: answers correctly


1c. LOC Commands: performs tasks correctly


2. Best Gaze: normal


3. Visual: no visual loss


4. Facial Palsy: normal symmetrical movement


5b. Motor Arm Right: no drift


5a. Motor Arm Left: no drift


6a. Motor Leg Left: no drift


6b. Motor Leg Right: no drift


7. Limb Ataxia: absent


8. Sensory: normal


9. Best Language: no aphasia


10. Dysarthria: normal


11. Extinction/Inattention: no abnormality


Total Score: 0


Stroke Severity: No Stroke Symptoms





- Psychiatric


Psychiatric exam: Present: normal affect, normal mood





- Skin


Skin exam: Present: warm, dry, intact, normal color.  Absent: rash





ED Course





 Vital Signs











  08/21/18





  19:53


 


Temperature 98 F


 


Pulse Rate 57 L


 


Respiratory 18





Rate 


 


Blood Pressure 125/66


 


O2 Sat by Pulse 98





Oximetry 














- Lab Data


Result diagrams: 


 08/21/18 20:35





 08/21/18 20:35





 Lab Results











  08/21/18 08/21/18 08/21/18 Range/Units





  20:35 20:35 20:35 


 


WBC  7.3    (4.5-11.0)  K/mm3


 


RBC  4.44    (3.65-5.03)  M/mm3


 


Hgb  12.8    (10.1-14.3)  gm/dl


 


Hct  37.6    (30.3-42.9)  %


 


MCV  85    (79-97)  fl


 


MCH  29    (28-32)  pg


 


MCHC  34    (30-34)  %


 


RDW  14.7    (13.2-15.2)  %


 


Plt Count  250    (140-440)  K/mm3


 


Lymph % (Auto)  39.0 H    (13.4-35.0)  %


 


Mono % (Auto)  7.7 H    (0.0-7.3)  %


 


Eos % (Auto)  2.5    (0.0-4.3)  %


 


Baso % (Auto)  0.9    (0.0-1.8)  %


 


Lymph #  2.9    (1.2-5.4)  K/mm3


 


Mono #  0.6    (0.0-0.8)  K/mm3


 


Eos #  0.2    (0.0-0.4)  K/mm3


 


Baso #  0.1    (0.0-0.1)  K/mm3


 


Seg Neutrophils %  49.9    (40.0-70.0)  %


 


Seg Neutrophils #  3.7    (1.8-7.7)  K/mm3


 


Sodium   142   (137-145)  mmol/L


 


Potassium   3.8   (3.6-5.0)  mmol/L


 


Chloride   104.9   ()  mmol/L


 


Carbon Dioxide   23   (22-30)  mmol/L


 


Anion Gap   18   mmol/L


 


BUN   10   (7-17)  mg/dL


 


Creatinine   0.9   (0.7-1.2)  mg/dL


 


Estimated GFR   > 60   ml/min


 


BUN/Creatinine Ratio   11   %


 


Glucose   101 H   ()  mg/dL


 


Calcium   9.5   (8.4-10.2)  mg/dL


 


Total Bilirubin    0.20  (0.1-1.2)  mg/dL


 


Direct Bilirubin    < 0.2  (0-0.2)  mg/dL


 


Indirect Bilirubin    0.0  mg/dL


 


AST    13  (5-40)  units/L


 


ALT    12  (7-56)  units/L


 


Alkaline Phosphatase    50  ()  units/L


 


Total Protein    6.9  (6.3-8.2)  g/dL


 


Albumin    4.3  (3.9-5)  g/dL


 


Albumin/Globulin Ratio    1.7  %














- Radiology Data





CT of the head shows no acute process


Critical care attestation.: 


If time is entered above; I have spent that time in minutes in the direct care 

of this critically ill patient, excluding procedure time.








ED Disposition


Clinical Impression: 


 Complicated migraine





Disposition: DC-01 TO HOME OR SELFCARE


Is pt being admited?: No


Does the pt Need Aspirin: No


Condition: Stable


Instructions:  Migraine Headache (ED)


Prescriptions: 


Butalb/Acetamin/Caff -40 [Fioricet] 1 tab PO Q6HR PRN #10 tab


 PRN Reason: Headache


Referrals: 


PRIMARY CARE,MD [Primary Care Provider] - 3-5 Days


Time of Disposition: 22:05

## 2018-08-22 ENCOUNTER — HOSPITAL ENCOUNTER (EMERGENCY)
Dept: HOSPITAL 5 - ED | Age: 48
LOS: 1 days | Discharge: PSYCHIATRIC OR SUBSTANCE USE FACILITY | End: 2018-08-23
Payer: MEDICAID

## 2018-08-22 VITALS — DIASTOLIC BLOOD PRESSURE: 73 MMHG | SYSTOLIC BLOOD PRESSURE: 114 MMHG

## 2018-08-22 VITALS — DIASTOLIC BLOOD PRESSURE: 82 MMHG | SYSTOLIC BLOOD PRESSURE: 122 MMHG

## 2018-08-22 DIAGNOSIS — F17.200: ICD-10-CM

## 2018-08-22 DIAGNOSIS — I10: ICD-10-CM

## 2018-08-22 DIAGNOSIS — F31.9: Primary | ICD-10-CM

## 2018-08-22 DIAGNOSIS — G43.909: ICD-10-CM

## 2018-08-22 DIAGNOSIS — Z86.73: ICD-10-CM

## 2018-08-22 LAB
BASOPHILS # (AUTO): 0.1 K/MM3 (ref 0–0.1)
BASOPHILS NFR BLD AUTO: 0.9 % (ref 0–1.8)
BENZODIAZEPINES SCREEN,URINE: (no result)
BILIRUB UR QL STRIP: (no result)
BLOOD UR QL VISUAL: (no result)
BUN SERPL-MCNC: 9 MG/DL (ref 7–17)
BUN/CREAT SERPL: 10 %
CALCIUM SERPL-MCNC: 9.5 MG/DL (ref 8.4–10.2)
EOSINOPHIL # BLD AUTO: 0.2 K/MM3 (ref 0–0.4)
EOSINOPHIL NFR BLD AUTO: 3.3 % (ref 0–4.3)
HCT VFR BLD CALC: 38.1 % (ref 30.3–42.9)
HEMOLYSIS INDEX: 15
HGB BLD-MCNC: 13 GM/DL (ref 10.1–14.3)
LYMPHOCYTES # BLD AUTO: 2.5 K/MM3 (ref 1.2–5.4)
LYMPHOCYTES NFR BLD AUTO: 43.3 % (ref 13.4–35)
MCH RBC QN AUTO: 28 PG (ref 28–32)
MCHC RBC AUTO-ENTMCNC: 34 % (ref 30–34)
MCV RBC AUTO: 83 FL (ref 79–97)
METHADONE SCREEN,URINE: (no result)
MONOCYTES # (AUTO): 0.5 K/MM3 (ref 0–0.8)
MONOCYTES % (AUTO): 8.2 % (ref 0–7.3)
MUCOUS THREADS #/AREA URNS HPF: (no result) /HPF
OPIATE SCREEN,URINE: (no result)
PH UR STRIP: 5 [PH] (ref 5–7)
PLATELET # BLD: 244 K/MM3 (ref 140–440)
PROT UR STRIP-MCNC: (no result) MG/DL
RBC # BLD AUTO: 4.59 M/MM3 (ref 3.65–5.03)
RBC #/AREA URNS HPF: < 1 /HPF (ref 0–6)
UROBILINOGEN UR-MCNC: < 2 MG/DL (ref ?–2)
WBC #/AREA URNS HPF: 1 /HPF (ref 0–6)

## 2018-08-22 PROCEDURE — 85025 COMPLETE CBC W/AUTO DIFF WBC: CPT

## 2018-08-22 PROCEDURE — 80048 BASIC METABOLIC PNL TOTAL CA: CPT

## 2018-08-22 PROCEDURE — 36415 COLL VENOUS BLD VENIPUNCTURE: CPT

## 2018-08-22 PROCEDURE — 80320 DRUG SCREEN QUANTALCOHOLS: CPT

## 2018-08-22 PROCEDURE — 80307 DRUG TEST PRSMV CHEM ANLYZR: CPT

## 2018-08-22 PROCEDURE — 99285 EMERGENCY DEPT VISIT HI MDM: CPT

## 2018-08-22 PROCEDURE — G0480 DRUG TEST DEF 1-7 CLASSES: HCPCS

## 2018-08-22 PROCEDURE — 84703 CHORIONIC GONADOTROPIN ASSAY: CPT

## 2018-08-22 PROCEDURE — 81001 URINALYSIS AUTO W/SCOPE: CPT

## 2018-08-22 NOTE — EMERGENCY DEPARTMENT REPORT
HPI





- General


Chief Complaint: Psych


Time Seen by Provider: 08/22/18 07:27





- HPI


HPI: 





Room 9





The patient is a 48-year-old female presenting with chief complaint of suicidal 

ideation.  The patient has had multiple deaths in her family over the past 6 

months including her mother who passed 3 weeks ago.  Patient states she's had 

suicidal ideation for one week.  Patient states her plan was to overdose on 

pills but she denies any active attempts.  The patient admits 17 years ago she 

did attempt suicide by cutting her wrists and taking pills.





Location: Mental state


Duration: One week


Quality: Suicidal


Severity: Severe


Modifying factors: [see above]


Context: [see above]


Mode of transportation: [not driving]





ED Past Medical Hx





- Past Medical History


Hx Hypertension: Yes


Hx CVA: Yes


Hx Headaches / Migraines: Yes


Hx Psychiatric Treatment: Yes (Bipolar)


Additional medical history: MRSA, Speech impairment, stomach ulcers





- Surgical History


Past Surgical History?: No


Additional Surgical History: Tonsil removed, Tubal ligation 1999, right Carpel 

tunnel; surgery





- Family History


Family history: no significant





- Social History


Smoking Status: Current Every Day Smoker (approximately one pack per day)


Substance Use Type: None (denies illicit drug use)





- Medications


Home Medications: 


 Home Medications











 Medication  Instructions  Recorded  Confirmed  Last Taken  Type


 


AtorvaSTATin [Lipitor] 40 mg PO QHS #30 tablet 08/06/18  Unknown Rx


 


Butalb/Acetamin/Caff -40 1 tab PO Q4H PRN #20 tablet 08/06/18  Unknown Rx





[Fioricet]     


 


Famotidine [Pepcid] 20 mg PO BID #30 tablet 08/06/18  Unknown Rx


 


Nicotine [Habitrol] 21 mg TD DAILY 30 Days  patch 08/06/18  Unknown Rx


 


Butalb/Acetamin/Caff -40 1 tab PO Q6HR PRN #10 tab 08/21/18  Unknown Rx





[Fioricet]     














ED Review of Systems


ROS: 


Stated complaint: DEPRESSION  SUICIDAL


Other details as noted in HPI





Constitutional: no symptoms reported


Eyes: denies: eye pain


ENT: denies: throat pain


Respiratory: no symptoms reported


Cardiovascular: denies: chest pain


Endocrine: no symptoms reported


Gastrointestinal: denies: abdominal pain


Psychiatric: suicidal thoughts





Physical Exam





- Physical Exam


Vital Signs: 


 Vital Signs











  08/22/18 08/22/18





  03:43 04:06


 


Temperature 98 F 98.1 F


 


Pulse Rate 55 L 62


 


Respiratory 16 16





Rate  


 


Blood Pressure 125/72 


 


Blood Pressure  124/81





[Left]  


 


O2 Sat by Pulse 98 100





Oximetry  











Physical Exam: 





GENERAL: The patient is well-developed well-nourished female lying on stretcher 

not appearing to be in acute distress. []


HEENT: Normocephalic.  Atraumatic.  Extraocular motions are intact.  Patient 

has moist mucous membranes.


NECK: Supple.  Trachea midline


CHEST/LUNGS: Clear to auscultation.  There is no respiratory distress noted.


HEART/CARDIOVASCULAR: Regular.  There is no tachycardia.  There is no gallop 

rub or murmur.


ABDOMEN: Abdomen is soft, nontender.  Patient has normal bowel sounds.  There 

is no abdominal distention.


SKIN: There is no rash.  There is no edema.  There is no diaphoresis.


NEURO: The patient is awake, alert, and oriented.  The patient is cooperative.  

The patient has no focal neurologic deficits.  The patient has normal speech


MUSCULOSKELETAL: There is no evidence of acute injury.





ED Course


 Vital Signs











  08/22/18 08/22/18





  03:43 04:06


 


Temperature 98 F 98.1 F


 


Pulse Rate 55 L 62


 


Respiratory 16 16





Rate  


 


Blood Pressure 125/72 


 


Blood Pressure  124/81





[Left]  


 


O2 Sat by Pulse 98 100





Oximetry  














ED Medical Decision Making





- Lab Data


Result diagrams: 


 08/22/18 08:08





 08/22/18 03:55





 Laboratory Tests











  08/22/18 08/22/18 08/22/18





  03:55 03:55 03:55


 


WBC   


 


RBC   


 


Hgb   


 


Hct   


 


MCV   


 


MCH   


 


MCHC   


 


RDW   


 


Plt Count   


 


Lymph % (Auto)   


 


Mono % (Auto)   


 


Eos % (Auto)   


 


Baso % (Auto)   


 


Lymph #   


 


Mono #   


 


Eos #   


 


Baso #   


 


Seg Neutrophils %   


 


Seg Neutrophils #   


 


Sodium    139


 


Potassium    4.0


 


Chloride    102.3


 


Carbon Dioxide    22


 


Anion Gap    19


 


BUN    9


 


Creatinine    0.9


 


Estimated GFR    > 60


 


BUN/Creatinine Ratio    10


 


Glucose    89


 


Calcium    9.5


 


HCG, Qual   


 


Salicylates  < 0.3 L  


 


Acetaminophen   < 5.0 L 


 


Plasma/Serum Alcohol   














  08/22/18 08/22/18 08/22/18





  03:55 03:55 08:08


 


WBC    5.8


 


RBC    4.59


 


Hgb    13.0


 


Hct    38.1


 


MCV    83


 


MCH    28


 


MCHC    34


 


RDW    14.7


 


Plt Count    244


 


Lymph % (Auto)    43.3 H


 


Mono % (Auto)    8.2 H


 


Eos % (Auto)    3.3


 


Baso % (Auto)    0.9


 


Lymph #    2.5


 


Mono #    0.5


 


Eos #    0.2


 


Baso #    0.1


 


Seg Neutrophils %    44.3


 


Seg Neutrophils #    2.6


 


Sodium   


 


Potassium   


 


Chloride   


 


Carbon Dioxide   


 


Anion Gap   


 


BUN   


 


Creatinine   


 


Estimated GFR   


 


BUN/Creatinine Ratio   


 


Glucose   


 


Calcium   


 


HCG, Qual   Negative 


 


Salicylates   


 


Acetaminophen   


 


Plasma/Serum Alcohol  < 0.01  














- Differential Diagnosis


suicidal ideation, grief


Critical care attestation.: 


If time is entered above; I have spent that time in minutes in the direct care 

of this critically ill patient, excluding procedure time.








ED Disposition


Clinical Impression: 


 Suicidal ideation





Disposition: DC/TX-65 PSY HOSP/PSY UNIT


Is pt being admited?: No


Does the pt Need Aspirin: No


Condition: Serious


Referrals: 


PRIMARY CARE,MD [Primary Care Provider] - 3-5 Days


Time of Disposition: 07:41 (awaiting acceptance)

## 2018-08-22 NOTE — CONSULTATION
History of Present Illness





- Reason for Consult


Consult date: 08/22/18


Reason for consult: Initial Psychiatric Evaluation





- Chief Complaint


Chief complaint: 


" depression"








Medications and Allergies


 Allergies











Allergy/AdvReac Type Severity Reaction Status Date / Time


 


Sulfa (Sulfonamide Allergy  Shortness Verified 03/31/18 11:36





Antibiotics)   of Breath  


 


sulfamethoxazole Allergy  Unknown Verified 08/04/18 23:42





[From Bactrim]     


 


trimethoprim [From Bactrim] Allergy  Unknown Verified 08/04/18 23:42











 Home Medications











 Medication  Instructions  Recorded  Confirmed  Last Taken  Type


 


AtorvaSTATin [Lipitor] 40 mg PO QHS #30 tablet 08/06/18  Unknown Rx


 


Butalb/Acetamin/Caff -40 1 tab PO Q4H PRN #20 tablet 08/06/18  Unknown Rx





[Fioricet]     


 


Famotidine [Pepcid] 20 mg PO BID #30 tablet 08/06/18  Unknown Rx


 


Nicotine [Habitrol] 21 mg TD DAILY 30 Days  patch 08/06/18  Unknown Rx


 


Butalb/Acetamin/Caff -40 1 tab PO Q6HR PRN #10 tab 08/21/18  Unknown Rx





[Fioricet]     














Mental Status Exam





- Vital signs


 Last Vital Signs











Temp  98 F   08/22/18 09:44


 


Pulse  87   08/22/18 09:44


 


Resp  17   08/22/18 09:44


 


BP  118/78   08/22/18 09:44


 


Pulse Ox  100   08/22/18 09:44














Results


Result Diagrams: 


 08/22/18 08:08





 08/22/18 03:55


 Abnormal lab results











  08/22/18 08/22/18 08/22/18 Range/Units





  03:55 03:55 08:08 


 


Lymph % (Auto)    43.3 H  (13.4-35.0)  %


 


Mono % (Auto)    8.2 H  (0.0-7.3)  %


 


Salicylates  < 0.3 L    (2.8-20.0)  mg/dL


 


Acetaminophen   < 5.0 L   (10.0-30.0)  ug/mL








All other labs normal.